# Patient Record
Sex: MALE | Race: WHITE | NOT HISPANIC OR LATINO | URBAN - METROPOLITAN AREA
[De-identification: names, ages, dates, MRNs, and addresses within clinical notes are randomized per-mention and may not be internally consistent; named-entity substitution may affect disease eponyms.]

---

## 2017-01-11 ENCOUNTER — COMMUNICATION - HEALTHEAST (OUTPATIENT)
Dept: INTERNAL MEDICINE | Facility: CLINIC | Age: 32
End: 2017-01-11

## 2017-02-09 ENCOUNTER — COMMUNICATION - HEALTHEAST (OUTPATIENT)
Dept: INTERNAL MEDICINE | Facility: CLINIC | Age: 32
End: 2017-02-09

## 2017-03-15 ENCOUNTER — COMMUNICATION - HEALTHEAST (OUTPATIENT)
Dept: INTERNAL MEDICINE | Facility: CLINIC | Age: 32
End: 2017-03-15

## 2017-04-12 ENCOUNTER — COMMUNICATION - HEALTHEAST (OUTPATIENT)
Dept: INTERNAL MEDICINE | Facility: CLINIC | Age: 32
End: 2017-04-12

## 2017-05-10 ENCOUNTER — COMMUNICATION - HEALTHEAST (OUTPATIENT)
Dept: INTERNAL MEDICINE | Facility: CLINIC | Age: 32
End: 2017-05-10

## 2017-06-07 ENCOUNTER — COMMUNICATION - HEALTHEAST (OUTPATIENT)
Dept: INTERNAL MEDICINE | Facility: CLINIC | Age: 32
End: 2017-06-07

## 2017-07-06 ENCOUNTER — COMMUNICATION - HEALTHEAST (OUTPATIENT)
Dept: INTERNAL MEDICINE | Facility: CLINIC | Age: 32
End: 2017-07-06

## 2017-10-16 ENCOUNTER — COMMUNICATION - HEALTHEAST (OUTPATIENT)
Dept: INTERNAL MEDICINE | Facility: CLINIC | Age: 32
End: 2017-10-16

## 2017-10-16 DIAGNOSIS — F33.9 EPISODE OF RECURRENT MAJOR DEPRESSIVE DISORDER, UNSPECIFIED DEPRESSION EPISODE SEVERITY (H): ICD-10-CM

## 2017-11-03 ENCOUNTER — OFFICE VISIT - HEALTHEAST (OUTPATIENT)
Dept: INTERNAL MEDICINE | Facility: CLINIC | Age: 32
End: 2017-11-03

## 2017-11-03 DIAGNOSIS — Z00.00 HEALTHCARE MAINTENANCE: ICD-10-CM

## 2017-11-03 DIAGNOSIS — F33.0 MILD EPISODE OF RECURRENT MAJOR DEPRESSIVE DISORDER (H): ICD-10-CM

## 2017-11-03 LAB
CHOLEST SERPL-MCNC: 154 MG/DL
FASTING STATUS PATIENT QL REPORTED: NORMAL
HDLC SERPL-MCNC: 51 MG/DL
LDLC SERPL CALC-MCNC: 84 MG/DL
TRIGL SERPL-MCNC: 94 MG/DL

## 2017-11-03 ASSESSMENT — MIFFLIN-ST. JEOR: SCORE: 2029.11

## 2017-11-06 ENCOUNTER — COMMUNICATION - HEALTHEAST (OUTPATIENT)
Dept: INTERNAL MEDICINE | Facility: CLINIC | Age: 32
End: 2017-11-06

## 2017-12-20 ENCOUNTER — OFFICE VISIT - HEALTHEAST (OUTPATIENT)
Dept: INTERNAL MEDICINE | Facility: CLINIC | Age: 32
End: 2017-12-20

## 2017-12-20 DIAGNOSIS — L03.90 CELLULITIS: ICD-10-CM

## 2017-12-20 ASSESSMENT — MIFFLIN-ST. JEOR: SCORE: 2024.58

## 2018-06-07 ENCOUNTER — COMMUNICATION - HEALTHEAST (OUTPATIENT)
Dept: INTERNAL MEDICINE | Facility: CLINIC | Age: 33
End: 2018-06-07

## 2018-06-07 DIAGNOSIS — F33.9 EPISODE OF RECURRENT MAJOR DEPRESSIVE DISORDER, UNSPECIFIED DEPRESSION EPISODE SEVERITY (H): ICD-10-CM

## 2018-06-08 ENCOUNTER — COMMUNICATION - HEALTHEAST (OUTPATIENT)
Dept: INTERNAL MEDICINE | Facility: CLINIC | Age: 33
End: 2018-06-08

## 2018-06-08 DIAGNOSIS — F33.9 EPISODE OF RECURRENT MAJOR DEPRESSIVE DISORDER, UNSPECIFIED DEPRESSION EPISODE SEVERITY (H): ICD-10-CM

## 2018-09-21 ENCOUNTER — OFFICE VISIT - HEALTHEAST (OUTPATIENT)
Dept: INTERNAL MEDICINE | Facility: CLINIC | Age: 33
End: 2018-09-21

## 2018-09-21 DIAGNOSIS — Z71.84 TRAVEL ADVICE ENCOUNTER: ICD-10-CM

## 2018-09-21 RX ORDER — LEVOCETIRIZINE DIHYDROCHLORIDE 5 MG/1
5 TABLET, FILM COATED ORAL EVERY EVENING
Status: SHIPPED | COMMUNITY
Start: 2018-09-21

## 2018-09-21 ASSESSMENT — MIFFLIN-ST. JEOR: SCORE: 2033.65

## 2018-12-31 ENCOUNTER — COMMUNICATION - HEALTHEAST (OUTPATIENT)
Dept: INTERNAL MEDICINE | Facility: CLINIC | Age: 33
End: 2018-12-31

## 2018-12-31 DIAGNOSIS — F33.9 EPISODE OF RECURRENT MAJOR DEPRESSIVE DISORDER, UNSPECIFIED DEPRESSION EPISODE SEVERITY (H): ICD-10-CM

## 2018-12-31 RX ORDER — FLUOXETINE 40 MG/1
CAPSULE ORAL
Qty: 90 CAPSULE | Refills: 3 | Status: SHIPPED | OUTPATIENT
Start: 2018-12-31

## 2021-05-25 ENCOUNTER — RECORDS - HEALTHEAST (OUTPATIENT)
Dept: ADMINISTRATIVE | Facility: CLINIC | Age: 36
End: 2021-05-25

## 2021-05-31 VITALS — HEIGHT: 71 IN | WEIGHT: 235 LBS | BODY MASS INDEX: 32.9 KG/M2

## 2021-05-31 VITALS — BODY MASS INDEX: 32.76 KG/M2 | WEIGHT: 234 LBS | HEIGHT: 71 IN

## 2021-06-02 VITALS — WEIGHT: 236 LBS | BODY MASS INDEX: 33.04 KG/M2 | HEIGHT: 71 IN

## 2021-06-13 NOTE — PROGRESS NOTES
[facility] Follow Up Note    Yogi Diaz   32 y.o. male    Date of Visit: 11/3/2017    Chief Complaint   Patient presents with     Annual Exam     pt fasting     Subjective  This is a 32-year-old man who comes in for his annual examination.  He is generally in good health and has no ongoing physical issues.  He does have a history of chronic depression going back over 10 years.  He has been on antidepressants off and on but over the past couple of years has been on Prozac on a regular basis.  This seems to work well.  He has been able to function and go about his usual business without difficulty.  He has had no depression symptoms and his PHQ 9 score is good.  When I saw him last year he was talking about getting  and he did in fact, Gladys his fiancée about 12 days ago.  They have purchased a house in [place].  He seems very happy with the situation.  Work is busy but not an issue for him.  No physical complaints at this time.  He did express a desire to work on losing some weight.  His only current medication is the Prozac.    Family history is reviewed and is unchanged.    ROS A comprehensive review of systems was performed and was otherwise negative    Medications, allergies, and problem list were reviewed and updated    Exam  General Appearance:   On examination his blood pressure is 130/68.  Weight is 235 pounds and height is 71 inches.  BMI is 33.01.    Eyes: Pupils are equal and conjunctiva are normal.    Ears nose and throat: External ears canals and tympanic membranes are normal.  Nose and throat are normal.    Neck: Supple with no masses and no neck vein distention.  No thyroid enlargement.    Lungs: Clear.    Cardiovascular: Heart is in a sinus rhythm with a rate of 90 and no ectopy.  No gallops or murmurs.  Carotid pulses are full with no bruits.  No peripheral edema.    GI: Abdomen is soft and nontender with no distention.  No masses or  organomegaly.    Musculoskeletal: Head and neck are normal to inspection with good range of motion.  All 4 extremities have good strength and range of motion.    Neurologic: Cranial nerves are intact.  Gait is normal.    Psychiatric: The patient is alert and oriented ×3.  Mood and affect are appropriate.      Assessment/Plan  1. Healthcare maintenance  Comprehensive Metabolic Panel    Lipid Cascade   2. Mild episode of recurrent major depressive disorder       The patient appears to be in stable health.  His depression is well controlled and he is having no side effects.  We discussed medication and for now will continue his current dose of Prozac.    We will check a CMP and lipids today and contact him with those results.  If everything is stable we will plan to see him back as needed.  We will work on diet and exercise and try to drop some weight.  The following high BMI interventions were performed this visit: encouragement to exercise and weight monitoring    Bob Alonso MD      Current Outpatient Prescriptions on File Prior to Visit   Medication Sig     FLUoxetine (PROZAC) 40 MG capsule Take 1 capsule (40 mg total) by mouth daily.     [DISCONTINUED] FLUoxetine (PROZAC) 40 MG capsule TAKE 1 CAPSULE BY MOUTH EVERY DAY     ibuprofen (ADVIL,MOTRIN) 200 MG tablet Take 400 mg by mouth every 6 (six) hours as needed for pain.     MULTIVIT &MINERALS/FERROUS FUM (MULTI VITAMIN ORAL) Take 1 tablet by mouth.     No current facility-administered medications on file prior to visit.      No Known Allergies  Social History   Substance Use Topics     Smoking status: Never Smoker     Smokeless tobacco: Never Used     Alcohol use None

## 2021-06-20 NOTE — PROGRESS NOTES
Office Visit - Follow Up   Yogi Diaz   33 y.o. male    Date of Visit: 9/21/2018    Chief Complaint   Patient presents with     Travel Consult     [facility] 11[birth year]        Assessment and Plan   1. Travel advice encounter  Reviewed upcoming travel to [facility] including the [place].  Reviewed itinerary which includes the cities of [facility] and [facility] [place] [facility] which are both at high [place].  Not traveling to anywhere where there is yellow fever or malaria risk.  Reviewed [facility] travel site.  Will provide hepatitis A vaccine today and she should get a booster in 6-12 months.  He should get a flu shot before traveling.  I would recommend typhoid vaccine with Vivotif and prescription sent to pharmacy.  We discussed the risk of Zika virus.  He has significant other have no plans of pregnancy in the next year and partner is on birth control.  While traveling, he should avoid local water supply and drink bottled water only.  He should bring Pepto-Bismol and Imodium tablets.  I will also prescribe Cipro 500 mg twice daily for 5 days to bring in case gastroenteritis develops.  No history of problems of altitude sickness.  We discussed maintaining good hydration and symptoms of high altitude sickness to watch for.    No Follow-up on file.     History of Present Illness   This 33 y.o. old planning trip to [facility].  Reviewed itinerary.  Reviewed medical history and immunization history.  Not traveling to anywhere at high risk for malaria or yellow fever.    Review of Systems:  Otherwise, a comprehensive review of systems was negative except as noted.     Medications, Allergies and Problem List   Patient Active Problem List   Diagnosis     Hyperhidrosis     Acne     Stroke Syndrome     Essential Hypertension     Chronic Major Depression       He has a past surgical history that includes pr abdomen surgery proc unlisted.    No Known Allergies    Current Outpatient Prescriptions   Medication Sig Dispense Refill     FLUoxetine  "(PROZAC) 40 MG capsule Take 1 capsule (40 mg total) by mouth daily. 90 capsule 1     levocetirizine (XYZAL) 5 MG tablet Take 5 mg by mouth every evening.       ciprofloxacin HCl (CIPRO) 500 MG tablet Take 1 tablet (500 mg total) by mouth 2 (two) times a day for 5 days. 10 tablet 0     ibuprofen (ADVIL,MOTRIN) 200 MG tablet Take 400 mg by mouth every 6 (six) hours as needed for pain.       MULTIVIT &MINERALS/FERROUS FUM (MULTI VITAMIN ORAL) Take 1 tablet by mouth.       typhoid (VIVOTIF) SR capsule Take 1 capsule by mouth every other day. 4 capsule 0     No current facility-administered medications for this visit.         Physical Exam   General Appearance:   Well-appearing male    /86 (Patient Site: Left Arm, Patient Position: Sitting, Cuff Size: Adult Large)  Pulse 82  Ht 5' 10.75\" (1.797 m)  Wt (!) 236 lb (107 kg)  SpO2 98%  BMI 33.15 kg/m2             Additional Information   Social History   Substance Use Topics     Smoking status: Never Smoker     Smokeless tobacco: Never Used     Alcohol use None              Facundo Ayala MD  "

## 2023-12-11 NOTE — PROGRESS NOTES
From: Stephane Costa  To: Dwayne Crespo  Sent: 12/9/2023 4:14 PM CST  Subject: Need RX cream     My Triamcinolone 0.1% cream has run out. Would you please send a new RX to our [facility] pharmacy in [place] on [address]?   Gerardo   [facility] Follow Up Note    Yogi Diaz   32 y.o. male    Date of Visit: 12/20/2017    Chief Complaint   Patient presents with     Mass     on chest, stated 12/10     Subjective  This is a 32-year-old man who comes in because of what he describes as a mass in his anterior chest wall.  He first noticed this suddenly appear about a week and a half ago.  It is on the lower end of the sternum.  He tells me that it was painful and swollen and red.  He applied some heat and actually compression and there was some drainage of initially some pus and then clear fluid.  He is continued this approach over the past week but it is not completely disappeared.  He is no longer able to get any drainage from the area.  He has not noticed any spread of the redness from the immediate area.  No fever or chills.  No other particular symptoms.    ROS A comprehensive review of systems was performed and was otherwise negative    Medications, allergies, and problem list were reviewed and updated    Exam  General Appearance:   On examination his blood pressure is 138/80.  Weight is 234 pounds and height is 71 inches.  BMI is 32.87.    Heart is in sinus rhythm with a rate of 74 and no ectopy.    In the anterior chest wall is about a quarter sized erythematous lesion.  It is not particularly tender.  There does seem to be a little firmness beneath the skin.  It is warm to touch.  There is no drainage.    The patient is alert and oriented ×3.      Assessment/Plan  1. Cellulitis       Probable small abscess with cellulitis in the anterior chest wall.  I advised him to continue the warm moist packs.  I will start him on Keflex 500 mg 4 times daily.  I advised him to call me if this is not improving but it should.  No other additional recommendations.  Body Mass Index was not assessed due to The patient was in with an acute medical issue..    Bob Alonso MD      Current Outpatient Prescriptions on File Prior to  Visit   Medication Sig     FLUoxetine (PROZAC) 40 MG capsule Take 1 capsule (40 mg total) by mouth daily.     ibuprofen (ADVIL,MOTRIN) 200 MG tablet Take 400 mg by mouth every 6 (six) hours as needed for pain.     MULTIVIT &MINERALS/FERROUS FUM (MULTI VITAMIN ORAL) Take 1 tablet by mouth.     No current facility-administered medications on file prior to visit.      No Known Allergies  Social History   Substance Use Topics     Smoking status: Never Smoker     Smokeless tobacco: Never Used     Alcohol use None